# Patient Record
Sex: FEMALE | Race: BLACK OR AFRICAN AMERICAN | NOT HISPANIC OR LATINO | Employment: OTHER | URBAN - METROPOLITAN AREA
[De-identification: names, ages, dates, MRNs, and addresses within clinical notes are randomized per-mention and may not be internally consistent; named-entity substitution may affect disease eponyms.]

---

## 2017-07-13 ENCOUNTER — TELEPHONE (OUTPATIENT)
Dept: NEUROSURGERY | Facility: CLINIC | Age: 57
End: 2017-07-13

## 2017-07-13 NOTE — TELEPHONE ENCOUNTER
Op note is not in EPIC, advised that I will transferred to ,edical records. Liza stated that she spoke with medical records and did not receive note that she was looking for. Also stated that patient may have had shunt placed at [facility]. I advised her to contact [facility] at [facility]. Understanding verbalized.

## 2017-07-13 NOTE — TELEPHONE ENCOUNTER
----- Message from Chantell Pope sent at 7/13/2017  9:04 AM CDT -----  Contact: eddie ([facility]) [phone]  Eddie is calling to get a Op report for shunt placed in pt.pls call

## 2022-02-02 DIAGNOSIS — M54.12 RADICULOPATHY, CERVICAL: Primary | ICD-10-CM

## 2022-02-03 ENCOUNTER — HOSPITAL ENCOUNTER (OUTPATIENT)
Dept: RADIOLOGY | Facility: HOSPITAL | Age: 62
Discharge: HOME | End: 2022-02-03
Attending: INTERNAL MEDICINE
Payer: MEDICARE

## 2022-02-03 DIAGNOSIS — M54.12 RADICULOPATHY, CERVICAL: ICD-10-CM

## 2022-02-03 PROCEDURE — 72141 MRI NECK SPINE W/O DYE: CPT | Mod: TC

## 2022-04-07 PROBLEM — M25.561 KNEE PAIN, RIGHT: Status: ACTIVE | Noted: 2022-04-07

## 2022-04-07 PROBLEM — M54.12 RADICULOPATHY, CERVICAL: Status: ACTIVE | Noted: 2022-04-07

## 2022-04-07 PROBLEM — R73.01 IMPAIRED FASTING GLUCOSE: Status: ACTIVE | Noted: 2022-04-07

## 2022-04-07 PROBLEM — I10 BENIGN ESSENTIAL HYPERTENSION: Status: ACTIVE | Noted: 2019-08-16

## 2022-04-10 PROBLEM — Z00.00 WELLNESS EXAMINATION: Status: ACTIVE | Noted: 2022-04-10

## 2022-05-17 DIAGNOSIS — Z01.818 ENCOUNTER FOR OTHER PREPROCEDURAL EXAMINATION: ICD-10-CM

## 2022-05-17 DIAGNOSIS — Z12.11 ENCOUNTER FOR SCREENING COLONOSCOPY FOR NON-HIGH-RISK PATIENT: Primary | ICD-10-CM

## 2022-05-17 RX ORDER — LIDOCAINE HYDROCHLORIDE 10 MG/ML
1 INJECTION, SOLUTION EPIDURAL; INFILTRATION; INTRACAUDAL; PERINEURAL ONCE
Status: CANCELLED | OUTPATIENT
Start: 2022-05-17 | End: 2022-05-17

## 2022-05-17 RX ORDER — SODIUM CHLORIDE 0.9 % (FLUSH) 0.9 %
10 SYRINGE (ML) INJECTION
Status: CANCELLED | OUTPATIENT
Start: 2022-05-17

## 2022-05-17 RX ORDER — SODIUM CHLORIDE 9 MG/ML
INJECTION, SOLUTION INTRAVENOUS CONTINUOUS
Status: CANCELLED | OUTPATIENT
Start: 2022-05-17

## 2022-06-20 NOTE — DISCHARGE INSTRUCTIONS
BEFORE THE PROCEDURE:    REPORT ANY CHANGE IN YOUR PHYSICAL CONDITION TO YOUR DOCTOR IMMEDIATELY.  SELF ISOLATE AND CHECK TEMPERATURE DAILY, IF TEMP OVER 100, CALL PHYSICIAN IMMEDIATELY.  TRY TO REFRAIN FROM SMOKING AND ALCOHOL 72 HOURS BEFORE YOUR PROCEDURE.   DO NOT EAT OR DRINK ANYTHING AFTER MIDNIGHT THE NIGHT BEFORE YOUR PROCEDURE.  NO MAKE UP, NAIL POLISH OR JEWELRY.      SOMEONE WILL CALL YOU THE DAY BEFORE YOUR PROCEDURE WITH A CHECK-IN TIME FOR YOUR PROCEDURE.    DAY OF YOUR PROCEDURE:    TAKE BLOOD PRESSURE MEDICATIONS THE MORNING OF YOUR PROCEDURE, WITH SMALL SIPS WATER, AS DIRECTED BY YOUR PHYSICIAN.   DO NOT TAKE ANY DIABETIC MEDICATIONS UNLESS DIRECTED TO DO SO BY YOUR PHYSICIAN.   CONTACT LENSES AND DENTURES MUST BE REMOVED.  A RESPONSIBLE ADULT MUST ACCOMPANY YOU HOME UPON DISCHARGE.   ONLY 1 VISITOR ALLOWED PER ROOM.         MASKS ARE OPTIONAL.    YOUR THOUGHTS AND OPINIONS HELP US TO BETTER SERVE YOU.     PLEASE PARTICIPATE IN SURVEYS ABOUT YOUR CARE.    THANK YOU FOR CHOOSING [facility].

## 2022-06-21 ENCOUNTER — HOSPITAL ENCOUNTER (OUTPATIENT)
Dept: PULMONOLOGY | Facility: HOSPITAL | Age: 62
Discharge: HOME | End: 2022-06-21
Attending: SURGERY
Payer: MEDICARE

## 2022-06-21 ENCOUNTER — HOSPITAL ENCOUNTER (OUTPATIENT)
Dept: PREADMISSION TESTING | Facility: HOSPITAL | Age: 62
Discharge: HOME | End: 2022-06-21
Attending: SURGERY
Payer: MEDICARE

## 2022-06-21 VITALS — HEIGHT: 59 IN | BODY MASS INDEX: 34.27 KG/M2 | WEIGHT: 170 LBS

## 2022-06-21 DIAGNOSIS — Z12.11 ENCOUNTER FOR SCREENING COLONOSCOPY FOR NON-HIGH-RISK PATIENT: ICD-10-CM

## 2022-06-21 PROCEDURE — 93005 ELECTROCARDIOGRAM TRACING: CPT

## 2022-06-21 PROCEDURE — 93010 ELECTROCARDIOGRAM REPORT: CPT | Mod: ,,, | Performed by: INTERNAL MEDICINE

## 2022-06-21 PROCEDURE — 93010 EKG 12-LEAD: ICD-10-PCS | Mod: ,,, | Performed by: INTERNAL MEDICINE

## 2022-06-21 RX ORDER — NEBIVOLOL 10 MG/1
10 TABLET ORAL DAILY
Status: ON HOLD | COMMUNITY
Start: 2022-05-24 | End: 2022-06-27 | Stop reason: CLARIF

## 2022-06-21 RX ORDER — TIZANIDINE 4 MG/1
4 TABLET ORAL EVERY 6 HOURS PRN
COMMUNITY

## 2022-06-21 RX ORDER — AMLODIPINE BESYLATE 5 MG/1
5 TABLET ORAL DAILY
Status: ON HOLD | COMMUNITY
End: 2022-06-27 | Stop reason: CLARIF

## 2022-06-21 RX ORDER — UBROGEPANT 100 MG/1
100 TABLET ORAL ONCE AS NEEDED
COMMUNITY
End: 2023-11-20 | Stop reason: SDUPTHER

## 2022-06-22 ENCOUNTER — ANESTHESIA EVENT (OUTPATIENT)
Dept: ENDOSCOPY | Facility: HOSPITAL | Age: 62
End: 2022-06-22
Payer: MEDICARE

## 2022-06-22 NOTE — ANESTHESIA PREPROCEDURE EVALUATION
06/22/2022  Josseline Mccullough is a 62 y.o., female.      Pre-op Assessment    I have reviewed the Patient Summary Reports.    I have reviewed the NPO Status.   I have reviewed the Medications.     Review of Systems  Anesthesia Hx:  No problems with previous Anesthesia  Denies Family Hx of Anesthesia complications.   Denies Personal Hx of Anesthesia complications.   Social:  Non-Smoker    Cardiovascular:   Hypertension, well controlled ECG has been reviewed.    Pulmonary:   Shortness of breath    Renal/:  Renal/ Normal     Hepatic/GI:  Hepatic/GI Normal    Musculoskeletal:   Arthritis     Neurological:   Neuromuscular Disease, Cervical radiculopathy, SHUNT RIGHT SIDE   Psych:   Psychiatric History depression        Lab Results   Component Value Date    WBC 6.12 06/21/2022    HGB 11.4 (L) 06/21/2022    HCT 35.0 (L) 06/21/2022    MCV 89 06/21/2022     06/21/2022     CMP  Sodium   Date Value Ref Range Status   06/21/2022 141 136 - 145 mmol/L Final     Potassium   Date Value Ref Range Status   06/21/2022 3.7 3.5 - 5.1 mmol/L Final     Chloride   Date Value Ref Range Status   06/21/2022 109 95 - 110 mmol/L Final     CO2   Date Value Ref Range Status   06/21/2022 29 23 - 29 mmol/L Final     Glucose   Date Value Ref Range Status   06/21/2022 99 70 - 110 mg/dL Final     BUN   Date Value Ref Range Status   06/21/2022 10 6 - 20 mg/dL Final     Creatinine   Date Value Ref Range Status   06/21/2022 0.7 0.5 - 1.4 mg/dL Final     Calcium   Date Value Ref Range Status   06/21/2022 8.5 (L) 8.7 - 10.5 mg/dL Final     Total Protein   Date Value Ref Range Status   06/21/2022 7.1 6.0 - 8.4 g/dL Final     Albumin   Date Value Ref Range Status   06/21/2022 3.4 (L) 3.5 - 5.2 g/dL Final     Total Bilirubin   Date Value Ref Range Status   06/21/2022 0.5 0.1 - 1.0 mg/dL Final     Comment:     For infants and newborns,  interpretation of results should be based  on gestational age, weight and in agreement with clinical  observations.    Premature Infant recommended reference ranges:  Up to 24 hours.............<8.0 mg/dL  Up to 48 hours............<12.0 mg/dL  3-5 days..................<15.0 mg/dL  6-29 days.................<15.0 mg/dL    For patients on Eltrombopag therapy, use of Dimension Lindsay TBIL is   not   recommended.       Alkaline Phosphatase   Date Value Ref Range Status   06/21/2022 72 55 - 135 U/L Final     AST   Date Value Ref Range Status   06/21/2022 11 10 - 40 U/L Final     ALT   Date Value Ref Range Status   06/21/2022 16 10 - 44 U/L Final     Anion Gap   Date Value Ref Range Status   06/21/2022 3 (L) 8 - 16 mmol/L Final     eGFR if    Date Value Ref Range Status   06/21/2022 >60.0 >60 mL/min/1.73 m^2 Final     eGFR if non    Date Value Ref Range Status   06/21/2022 >60.0 >60 mL/min/1.73 m^2 Final     Comment:     Calculation used to obtain the estimated glomerular filtration  rate (eGFR) is the CKD-EPI equation.          Physical Exam  General: Well nourished    Airway:  Mallampati: III / II  Mouth Opening: Normal  TM Distance: Normal  Tongue: Normal  Neck ROM: Normal ROM    Dental:  Intact    Chest/Lungs:  Clear to auscultation    Heart:  Rate: Normal  Rhythm: Regular Rhythm  Sounds: Normal        Anesthesia Plan  Type of Anesthesia, risks & benefits discussed:    Anesthesia Type: MAC  Intra-op Monitoring Plan: Standard ASA Monitors  Post Op Pain Control Plan: multimodal analgesia  Induction:  IV  Airway Plan: Direct  Informed Consent: Informed consent signed with the Patient and all parties understand the risks and agree with anesthesia plan.  All questions answered.   ASA Score: 4  Day of Surgery Review of History & Physical: I have interviewed and examined the patient. I have reviewed the patient's H&P dated: There are no significant changes.     Ready For Surgery From  Anesthesia Perspective.     .

## 2022-06-26 PROBLEM — Z12.11 ENCOUNTER FOR SCREENING COLONOSCOPY FOR NON-HIGH-RISK PATIENT: Status: ACTIVE | Noted: 2022-06-26

## 2022-06-26 NOTE — H&P
[facility]  General Surgery  History & Physical    Patient Name: Josseline Mccullough  MRN: [id number]  Admission Date: (Not on file)  Attending Physician: Cordell Dempsey MD   Primary Care Provider: Adelaida Urbina MD    Patient information was obtained from patient and past medical records.     Subjective:     Chief Complaint/Reason for Admission:  Screening colonoscopy    History of Present Illness:  Patient is a 62 y.o. female who is now being admitted for a screening colonoscopy.  The last 1 she had was over 10 years ago.  The patient is asymptomatic and has no family history of colon cancer.    No current facility-administered medications on file prior to encounter.     Current Outpatient Medications on File Prior to Encounter   Medication Sig    ALPRAZolam (XANAX) 1 MG tablet TAKE 1 TABLET BY MOUTH 30 MINUTES BEFORE PROCEDURE. MAY REPEAT DOSE ONE TIME    aluminum-magnesium hydroxide-simethicone (MAALOX) 200-200-20 mg/5 mL Susp     ARIPiprazole (ABILIFY) 2 MG Tab 1 tablet.    aspirin (ECOTRIN) 81 MG EC tablet 1 tablet.    brivaracetam (BRIVIACT) 25 mg Tab 1/2 tab po qhs    buPROPion (WELLBUTRIN XL) 150 MG TB24 tablet     zmd-I0-tou42-zinc--quinn-bor (CALTRATE 600-D PLUS MINERALS) 600 mg calcium- 800 unit-50 mg Tab 1 tablet.    clonazePAM (KLONOPIN) 2 MG Tab 1 tablet.    dexbrompheniramine maleate (ALA-HIST IR) 2 mg Tab 1 tablet.    diclofenac sodium (VOLTAREN) 1 % Gel apply 2 grams to the affected area(s) by topical route 4 times per day    DULoxetine (CYMBALTA) 60 MG capsule 1 capsule.    ergocalciferol (ERGOCALCIFEROL) 50,000 unit Cap TAKE 1 CAPSULE BY MOUTH 2 TIMES A WEEK    fenoprofen (NAPROFEN) 600 mg Tab 1 tablet.    furosemide (LASIX) 20 MG tablet Take 20 mg by mouth once as needed.    ibuprofen (ADVIL,MOTRIN) 800 MG tablet 1 tablet.    latanoprost 0.005 % ophthalmic solution instill 1 drop into both eyes by ophthalmic route once daily in the evening    linaCLOtide (LINZESS) 145 mcg  Cap capsule Take 1 capsule (145 mcg total) by mouth before breakfast.    methocarbamoL (ROBAXIN) 750 MG Tab 1 tablet.    predniSONE (DELTASONE) 10 MG tablet Take 1 tablet (10 mg total) by mouth once daily.    prochlorperazine (COMPAZINE) 10 MG tablet 1 tablet.    rizatriptan (MAXALT) 10 MG tablet 1 tablet.    SUMAtriptan-naproxen (TREXIMET)  mg Tab 1 tablet.    traZODone (DESYREL) 50 MG tablet 1 tablet.       Review of patient's allergies indicates:   Allergen Reactions    Cortisone Hives, Itching, Swelling and Rash     RASH    Aripiprazole Hives, Rash and Other (See Comments)     MOUTH ULCERS  Other reaction(s): rash/hives    Bellad alk-peppermnt oil-anise Hives and Other (See Comments)     MOUTH ULCERS    Benadryl [diphenhydramine hcl] Hives, Itching, Swelling and Rash    Chlordiazepoxide-clidinium Hives and Rash     Other reaction(s): Mouth irritation    Ciprofloxacin Itching, Rash and Other (See Comments)     MOUTH ULCERS  Other reaction(s): rash    Codeine Hives, Itching, Swelling and Rash    Hydrochlorothiazide Hives, Itching, Swelling and Rash     Other reaction(s): rash?    Hydrocodone-acetaminophen Hives, Itching, Swelling and Rash     Other reaction(s): rash    Lexapro [escitalopram] Hives, Rash and Other (See Comments)     MOUTH ULCERS    Metaxalone Hives, Rash and Other (See Comments)     MOUTH ULCERS  Other reaction(s): Itching    Penicillins Hives, Itching, Swelling and Rash    Pregabalin Hives and Swelling     Other reaction(s): swelling, wt gain    Sulfa (sulfonamide antibiotics) Hives, Itching, Swelling and Rash     Other reaction(s): Hives    Belladonna alkaloids      Other reaction(s): mouth ulcers    Codeine sulfate     Escitalopram oxalate      Other reaction(s): rash    Moxifloxacin     Oxycodone-acetaminophen      Other reaction(s): rash    Steroid [corticosteroids (glucocorticoids)]     Oxycodone Hives, Itching, Swelling and Rash    Tapentadol Hives, Rash and  Other (See Comments)     MOUTH ULCERS  Other reaction(s): rash       Past Medical History:   Diagnosis Date    Asymptomatic varicose veins of unspecified lower extremity     CSF leak     depression     Depression     Diabetes mellitus     Edema     Gastritis     Headache     History of chest pain     IFG (impaired fasting glucose)     Major depressive disorder, recurrent episode, mild     Obesity     Osteoarthritis of right hand     Palpitations     Postmenopausal disorder     Radiculopathy, cervical     Sleep disorder     Snoring     SOB (shortness of breath)     Tendonitis     Unspecified glaucoma      Past Surgical History:   Procedure Laterality Date    CARPAL TUNNEL RELEASE Right 2019    Dr Subramanian     SECTION  1992    COLONOSCOPY  2012    Dr Dempsey melanosis coli, mild proctits    FINGER SURGERY Left 2019    nodule removed from fingers on lt hand    HYSTERECTOMY  2009    Dr Kong    NECK SURGERY      SHUNT REVISION  2012    [facility]    TUBAL LIGATION       Family History     Problem Relation (Age of Onset)    Arthritis Mother    Asthma Mother    Diabetes Mother, Sister    Heart attack Mother    Heart failure Father (55)    Hyperlipidemia Mother    Hypertension Mother, Father, Sister    Lung cancer Mother (73)    No Known Problems Brother, Sister    Stroke Father        Tobacco Use    Smoking status: Never Smoker    Smokeless tobacco: Never Used   Substance and Sexual Activity    Alcohol use: Yes     Alcohol/week: 0.8 standard drinks     Types: 1 Standard drinks or equivalent per week     Comment: social    Drug use: No    Sexual activity: Not on file     Review of Systems   Constitutional: Negative for activity change and appetite change.   Gastrointestinal: Negative for abdominal distention, abdominal pain and anal bleeding.   Neurological:        History of Chiari malformation with low-pressure  hydrocephalus treated with a  shunt     Objective:     Vital Signs (Most Recent):    Vital Signs (24h Range):           There is no height or weight on file to calculate BMI.    Physical Exam  Constitutional:       Appearance: Normal appearance.   HENT:      Head:      Comments: Patient has a palpable reservoir on the right side of her head with a palpable shunt down the right side towards the neck and chest.     Nose: Nose normal.      Mouth/Throat:      Mouth: Mucous membranes are moist.   Eyes:      Extraocular Movements: Extraocular movements intact.   Neck:      Comments: Presence of a shunt subcutaneously going to the right side of her chest.  Cardiovascular:      Rate and Rhythm: Normal rate and regular rhythm.   Pulmonary:      Effort: Pulmonary effort is normal.      Breath sounds: Normal breath sounds.   Abdominal:      General: Abdomen is flat. There is no distension.      Palpations: Abdomen is soft. There is no mass.      Tenderness: There is no abdominal tenderness.      Hernia: No hernia is present.      Comments: There is an incision in the right upper quadrant where the shunt is entering the abdominal cavity.   Musculoskeletal:         General: Normal range of motion.      Cervical back: Neck supple.   Lymphadenopathy:      Cervical: No cervical adenopathy.   Skin:     General: Skin is warm and dry.      Coloration: Skin is not jaundiced.   Neurological:      General: No focal deficit present.      Mental Status: She is alert and oriented to person, place, and time.   Psychiatric:         Mood and Affect: Mood normal.         Behavior: Behavior normal.         Significant Labs:  I have reviewed all pertinent lab results within the past 24 hours.    Significant Diagnostics:  I have reviewed all pertinent imaging results/findings within the past 24 hours.    Assessment/Plan:     Active Diagnoses:    Diagnosis Date Noted POA    PRINCIPAL PROBLEM:  Encounter for screening colonoscopy for  non-high-risk patient [Z12.11] 06/26/2022 Not Applicable      Problems Resolved During this Admission:     VTE Risk Mitigation (From admission, onward)    None          Cordell Dempsey MD  General Surgery  [facility]

## 2022-06-27 ENCOUNTER — HOSPITAL ENCOUNTER (OUTPATIENT)
Facility: HOSPITAL | Age: 62
Discharge: HOME | End: 2022-06-27
Attending: SURGERY | Admitting: SURGERY
Payer: MEDICARE

## 2022-06-27 ENCOUNTER — ANESTHESIA (OUTPATIENT)
Dept: ENDOSCOPY | Facility: HOSPITAL | Age: 62
End: 2022-06-27
Payer: MEDICARE

## 2022-06-27 VITALS
HEART RATE: 68 BPM | TEMPERATURE: 98 F | SYSTOLIC BLOOD PRESSURE: 145 MMHG | OXYGEN SATURATION: 96 % | RESPIRATION RATE: 20 BRPM | DIASTOLIC BLOOD PRESSURE: 54 MMHG

## 2022-06-27 DIAGNOSIS — Z12.11 ENCOUNTER FOR SCREENING COLONOSCOPY FOR NON-HIGH-RISK PATIENT: Primary | ICD-10-CM

## 2022-06-27 DIAGNOSIS — D12.4 ADENOMATOUS POLYP OF DESCENDING COLON: Chronic | ICD-10-CM

## 2022-06-27 DIAGNOSIS — D12.3 ADENOMATOUS POLYP OF TRANSVERSE COLON: Chronic | ICD-10-CM

## 2022-06-27 PROCEDURE — 37000008 HC ANESTHESIA 1ST 15 MINUTES: Performed by: SURGERY

## 2022-06-27 PROCEDURE — 25000003 PHARM REV CODE 250: Performed by: SURGERY

## 2022-06-27 PROCEDURE — 37000009 HC ANESTHESIA EA ADD 15 MINS: Performed by: SURGERY

## 2022-06-27 PROCEDURE — 45384 COLONOSCOPY W/LESION REMOVAL: CPT | Mod: PT | Performed by: SURGERY

## 2022-06-27 RX ORDER — OLMESARTAN MEDOXOMIL 40 MG/1
40 TABLET ORAL DAILY
COMMUNITY
End: 2023-05-17 | Stop reason: SDUPTHER

## 2022-06-27 RX ORDER — PROPOFOL 10 MG/ML
VIAL (ML) INTRAVENOUS
Status: DISCONTINUED | OUTPATIENT
Start: 2022-06-27 | End: 2022-06-27

## 2022-06-27 RX ORDER — LIDOCAINE HYDROCHLORIDE 10 MG/ML
INJECTION, SOLUTION INTRAVENOUS
Status: DISCONTINUED | OUTPATIENT
Start: 2022-06-27 | End: 2022-06-27

## 2022-06-27 RX ORDER — SODIUM CHLORIDE 9 MG/ML
INJECTION, SOLUTION INTRAVENOUS CONTINUOUS
Status: DISCONTINUED | OUTPATIENT
Start: 2022-06-27 | End: 2022-06-27 | Stop reason: HOSPADM

## 2022-06-27 RX ORDER — SODIUM CHLORIDE 0.9 % (FLUSH) 0.9 %
10 SYRINGE (ML) INJECTION
Status: DISCONTINUED | OUTPATIENT
Start: 2022-06-27 | End: 2022-06-27 | Stop reason: HOSPADM

## 2022-06-27 RX ORDER — LIDOCAINE HYDROCHLORIDE 10 MG/ML
1 INJECTION, SOLUTION EPIDURAL; INFILTRATION; INTRACAUDAL; PERINEURAL ONCE
Status: DISCONTINUED | OUTPATIENT
Start: 2022-06-27 | End: 2022-06-27 | Stop reason: HOSPADM

## 2022-06-27 RX ADMIN — Medication 40 MG: at 08:06

## 2022-06-27 RX ADMIN — Medication 30 MG: at 08:06

## 2022-06-27 RX ADMIN — Medication 20 MG: at 08:06

## 2022-06-27 RX ADMIN — SODIUM CHLORIDE: 0.9 INJECTION, SOLUTION INTRAVENOUS at 07:06

## 2022-06-27 RX ADMIN — Medication 100 MG: at 08:06

## 2022-06-27 RX ADMIN — LIDOCAINE HYDROCHLORIDE 50 MG: 10 INJECTION, SOLUTION INTRAVENOUS at 08:06

## 2022-06-27 NOTE — DISCHARGE INSTRUCTIONS
NO DRIVING OR ALCOHOL 24 HOURS AFTER PROCEDURE     NOTIFY DR BRIGGS OF ANY PROBLEMS OR CONCERNS    FOLLOW UP WITH DR BRIGGS    July 5 AT 1030  AM

## 2022-06-27 NOTE — TRANSFER OF CARE
Anesthesia Transfer of Care Note    Patient: Josseline Mccullough    Procedure(s) Performed: Procedure(s) (LRB):  COLONOSCOPY, WITH POLYPECTOMY USING HOT BIOPSY FORCEPS (N/A)    Patient location: GI    Anesthesia Type: MAC    Transport from OR: Transported from OR on room air with adequate spontaneous ventilation    Post pain: adequate analgesia    Post assessment: no apparent anesthetic complications    Post vital signs: stable    Level of consciousness: awake    Nausea/Vomiting: no nausea/vomiting    Complications: none    Transfer of care protocol was followed      Last vitals:   HR 68  RR 16  T 36.7  SPO2 100  /56

## 2022-06-27 NOTE — DISCHARGE SUMMARY
[facility] - Endoscopy  Discharge Note  Short Stay    Procedure(s) (LRB):  COLONOSCOPY, WITH POLYPECTOMY USING HOT BIOPSY FORCEPS (N/A)    OUTCOME: Patient tolerated treatment/procedure well without complication and is now ready for discharge.    DISPOSITION: Home or Self Care    FINAL DIAGNOSIS:  1. Encounter for screening colonoscopy for non-high-risk patient 2. Colon polyps at 40 cm in the proximal descending colon and at 55 cm in the distal transverse colon    FOLLOWUP: Patient is to follow up in clinic in 1 week for pathology report.    DISCHARGE INSTRUCTIONS:    Discharge Procedure Orders   Diet Adult Regular     Notify your health care provider if you experience any of the following:  temperature >100.4     Notify your health care provider if you experience any of the following:  persistent nausea and vomiting or diarrhea     Notify your health care provider if you experience any of the following:  severe uncontrolled pain     Activity as tolerated        TIME SPENT ON DISCHARGE:  15 minutes

## 2022-06-27 NOTE — OP NOTE
[facility] - Endoscopy  Colonoscopy Procedure  Operative Note    SUMMARY     Date of Procedure: 6/27/2022     Procedure: Procedure(s) (LRB):  COLONOSCOPY, WITH POLYPECTOMY USING HOT BIOPSY FORCEPS (N/A)    Surgeon(s) and Role:     * Cordell Dempsey MD - Primary    Assisting Surgeon: None     Patient location: PACU    Pre-Operative Diagnosis: Encounter for screening colonoscopy for non-high-risk patient [Z12.11]    Post-Operative Diagnosis: Post-Op Diagnosis Codes:     * Encounter for screening colonoscopy for non-high-risk patient [Z12.11]     * Adenomatous polyp of transverse colon [D12.3]     * Adenomatous polyp of descending colon [D12.4]     Indications:  Screening colonoscopy     Anesthesia:  Mac        Procedure:                  The patient was brought in to the endoscopy suite where the risks, benefits, and alternatives of the procedure were described.  The patient was given the opportunity to ask questions and then signed informed consent.  Patient was positioned in the left lateral decubitus position, continuous monitoring was initiated, and supplemental oxygen was provided via nasal cannula.  Adequate sedation was achieved with the above mentioned medications and then titrated during the entire procedure.  Digital rectal exam was performed.  Under direct visualization the colonoscope was introduced through the anus in to the rectum.  The scope was then advanced to the cecum, which was identified by the ileocecal valve and appendiceal orifice.  Scope was then withdrawn and the mucosa was carefully examined in a circular fashion.  The entire colonic mucosa was examined, including the rectum with retroflexion.  Air was evacuated from the colon and the procedure was terminated.  The patient tolerated the procedure well and was able to be transferred to the recovery area in stable condition.    Findings:                 Digital rectal examination:  Rectal exam showed normal sphincter tone with no masses palpated.   There was no blood on the glove.                      Rectum:  Rectal mucosa appeared unremarkable                    Sigmoid:  Sigmoid colon was normal        Descending:  In the descending colon at about 40 cm a small polyp identified and removed via the hot biopsy forceps.         Transverse:  In the transverse colon at about 55 cm another polyp identified and removed via hot biopsy forceps         Ascending:  Ascending colon was normal        Cecum:  Cecal mucosa appeared unremarkable.  Ileocecal valve and appendiceal orifice appeared normal.  We tried to cannulate the ileocecal valve and advanced into the terminal ileum but were unable to do that.  After much trying we ceased and desisted.        Terminal Ileum:  Not visualized     Specimens:   Specimens (From admission, onward)     Start     Ordered    06/27/22 0836  Specimen to Pathology, Surgery Gastrointestinal tract  Once        Comments: Pre-op Diagnosis: Encounter for screening colonoscopy for non-high-risk patient [Z12.11]Procedure(s):COLONOSCOPY Number of specimens: 1Name of specimens: 1 polyp at 55cm @10992 polyp at 40 cm @0839     References:    Click here for ordering Quick Tip   Question Answer Comment   Procedure Type: Gastrointestinal tract    Specimen Class: Routine/Screening    Which provider would you like to cc? JENNIFER BRIGGS    Release to patient Immediate        06/27/22 0842                  Estimated Blood Loss (EBL): * No values recorded between 6/27/2022 12:00 AM and 6/27/2022  8:42 AM *     Complications: No     Diagnostic Impression:  1. Screening colonoscopy 2. Polyp in the descending colon at 40 cm and in the distal transverse colon at 55 cm.      Recommendations: Discharge patient to home.    Disposition: PACU - hemodynamically stable.     Attestation: I performed the procedure.        Follow Up:             Future Appointments   Date Time Provider Department Center   8/8/2022 10:05 AM Adelaida Urbina MD [facility]            Cordell Dempsey MD  6/27/2022

## 2022-06-27 NOTE — INTERVAL H&P NOTE
The patient has been examined and the H&P has been reviewed:    I concur with the findings and no changes have occurred since H&P was written.    Surgery risks, benefits and alternative options discussed and understood by patient/family.          Active Hospital Problems    Diagnosis  POA    *Encounter for screening colonoscopy for non-high-risk patient [Z12.11]  Not Applicable      Resolved Hospital Problems   No resolved problems to display.

## 2022-06-28 LAB — POCT GLUCOSE: 99 MG/DL (ref 70–110)

## 2022-06-28 NOTE — ANESTHESIA POSTPROCEDURE EVALUATION
Anesthesia Post Evaluation    Patient: Josseline Mccullough    Procedure(s) Performed: Procedure(s) (LRB):  COLONOSCOPY, WITH POLYPECTOMY USING HOT BIOPSY FORCEPS (N/A)    Final Anesthesia Type: MAC      Patient location during evaluation: OPS  Patient participation: Yes- Able to Participate  Level of consciousness: awake  Post-procedure vital signs: reviewed and stable  Pain management: adequate  Airway patency: patent    PONV status at discharge: No PONV  Anesthetic complications: no      Cardiovascular status: blood pressure returned to baseline  Respiratory status: spontaneous ventilation  Hydration status: euvolemic  Follow-up not needed.          Vitals Value Taken Time   /54 06/27/22 0903   Temp 36.7 °C (98 °F) 06/27/22 0903   Pulse 68 06/27/22 0903   Resp 20 06/27/22 0903   SpO2 96 % 06/27/22 0903         No case tracking events are documented in the log.      Pain/Ryan Score: Ryan Score: 10 (6/27/2022  9:03 AM)        
19

## 2022-06-29 LAB — SPECIMEN TO PATHOLOGY - SURGICAL: NORMAL

## 2022-07-11 PROBLEM — Z00.00 WELLNESS EXAMINATION: Status: RESOLVED | Noted: 2022-04-10 | Resolved: 2022-07-11

## 2022-09-15 ENCOUNTER — LAB VISIT (OUTPATIENT)
Dept: LAB | Facility: HOSPITAL | Age: 62
End: 2022-09-15
Attending: INTERNAL MEDICINE
Payer: MEDICARE

## 2022-09-15 DIAGNOSIS — I10 BENIGN ESSENTIAL HYPERTENSION: ICD-10-CM

## 2022-09-15 PROBLEM — R22.1 NECK SWELLING: Status: ACTIVE | Noted: 2022-09-15

## 2022-09-15 LAB
ALBUMIN SERPL BCP-MCNC: 3.5 G/DL (ref 3.5–5.2)
ALP SERPL-CCNC: 73 U/L (ref 55–135)
ALT SERPL W/O P-5'-P-CCNC: 15 U/L (ref 10–44)
ANION GAP SERPL CALC-SCNC: 2 MMOL/L (ref 8–16)
AST SERPL-CCNC: 12 U/L (ref 10–40)
BILIRUB SERPL-MCNC: 0.4 MG/DL (ref 0.1–1)
BUN SERPL-MCNC: 10 MG/DL (ref 6–20)
CALCIUM SERPL-MCNC: 8.8 MG/DL (ref 8.7–10.5)
CHLORIDE SERPL-SCNC: 109 MMOL/L (ref 95–110)
CO2 SERPL-SCNC: 28 MMOL/L (ref 23–29)
CREAT SERPL-MCNC: 0.7 MG/DL (ref 0.5–1.4)
EST. GFR  (NO RACE VARIABLE): >60 ML/MIN/1.73 M^2
GLUCOSE SERPL-MCNC: 113 MG/DL (ref 70–110)
POTASSIUM SERPL-SCNC: 3.7 MMOL/L (ref 3.5–5.1)
PROT SERPL-MCNC: 7.3 G/DL (ref 6–8.4)
SODIUM SERPL-SCNC: 139 MMOL/L (ref 136–145)

## 2022-09-15 PROCEDURE — 80053 COMPREHEN METABOLIC PANEL: CPT | Performed by: INTERNAL MEDICINE

## 2022-09-15 PROCEDURE — 36415 COLL VENOUS BLD VENIPUNCTURE: CPT | Performed by: INTERNAL MEDICINE

## 2022-09-16 ENCOUNTER — LAB VISIT (OUTPATIENT)
Dept: LAB | Facility: HOSPITAL | Age: 62
End: 2022-09-16
Attending: INTERNAL MEDICINE
Payer: MEDICARE

## 2022-09-16 DIAGNOSIS — E03.9 HYPOTHYROIDISM, UNSPECIFIED TYPE: ICD-10-CM

## 2022-09-16 LAB — TSH SERPL DL<=0.005 MIU/L-ACNC: 1.06 UIU/ML (ref 0.4–4)

## 2022-09-16 PROCEDURE — 36415 COLL VENOUS BLD VENIPUNCTURE: CPT | Performed by: INTERNAL MEDICINE

## 2022-09-16 PROCEDURE — 84443 ASSAY THYROID STIM HORMONE: CPT | Performed by: INTERNAL MEDICINE

## 2022-09-20 ENCOUNTER — HOSPITAL ENCOUNTER (OUTPATIENT)
Dept: RADIOLOGY | Facility: HOSPITAL | Age: 62
Discharge: HOME | End: 2022-09-20
Attending: INTERNAL MEDICINE
Payer: MEDICARE

## 2022-09-20 DIAGNOSIS — G89.29 CHRONIC NONINTRACTABLE HEADACHE, UNSPECIFIED HEADACHE TYPE: ICD-10-CM

## 2022-09-20 DIAGNOSIS — R51.9 CHRONIC NONINTRACTABLE HEADACHE, UNSPECIFIED HEADACHE TYPE: ICD-10-CM

## 2022-09-20 PROCEDURE — 70551 MRI BRAIN STEM W/O DYE: CPT | Mod: TC

## 2023-07-17 PROBLEM — E66.01 SEVERE OBESITY (BMI 35.0-39.9) WITH COMORBIDITY: Status: ACTIVE | Noted: 2023-07-17

## 2023-07-17 PROBLEM — K59.00 CONSTIPATION: Status: ACTIVE | Noted: 2023-07-17

## 2023-07-17 PROBLEM — F33.0 MILD EPISODE OF RECURRENT MAJOR DEPRESSIVE DISORDER: Status: ACTIVE | Noted: 2023-07-17

## 2023-07-17 PROBLEM — M25.511 ACUTE PAIN OF RIGHT SHOULDER: Status: ACTIVE | Noted: 2023-07-17

## 2023-10-04 ENCOUNTER — PATIENT MESSAGE (OUTPATIENT)
Dept: ADMINISTRATIVE | Facility: OTHER | Age: 63
End: 2023-10-04
Payer: MEDICARE

## 2023-10-16 PROBLEM — Z00.00 WELLNESS EXAMINATION: Status: RESOLVED | Noted: 2022-04-10 | Resolved: 2023-10-16

## 2023-11-20 ENCOUNTER — LAB VISIT (OUTPATIENT)
Dept: LAB | Facility: HOSPITAL | Age: 63
End: 2023-11-20
Attending: INTERNAL MEDICINE
Payer: MEDICARE

## 2023-11-20 DIAGNOSIS — R73.01 IMPAIRED FASTING GLUCOSE: ICD-10-CM

## 2023-11-20 DIAGNOSIS — I10 BENIGN ESSENTIAL HYPERTENSION: ICD-10-CM

## 2023-11-20 DIAGNOSIS — Z00.00 WELLNESS EXAMINATION: ICD-10-CM

## 2023-11-20 LAB
ALBUMIN SERPL BCP-MCNC: 3.4 G/DL (ref 3.5–5.2)
ALBUMIN/CREAT UR: NORMAL UG/MG (ref 0–30)
ALP SERPL-CCNC: 61 U/L (ref 55–135)
ALT SERPL W/O P-5'-P-CCNC: 16 U/L (ref 10–44)
ANION GAP SERPL CALC-SCNC: 8 MMOL/L (ref 3–11)
AST SERPL-CCNC: 14 U/L (ref 10–40)
BASOPHILS # BLD AUTO: 0.03 K/UL (ref 0–0.2)
BASOPHILS NFR BLD: 0.5 % (ref 0–1.9)
BILIRUB SERPL-MCNC: 0.5 MG/DL (ref 0.1–1)
BUN SERPL-MCNC: 13 MG/DL (ref 8–23)
CALCIUM SERPL-MCNC: 8.7 MG/DL (ref 8.7–10.5)
CHLORIDE SERPL-SCNC: 105 MMOL/L (ref 95–110)
CO2 SERPL-SCNC: 28 MMOL/L (ref 23–29)
CREAT SERPL-MCNC: 0.8 MG/DL (ref 0.5–1.4)
CREAT UR-MCNC: 73 MG/DL (ref 15–325)
DIFFERENTIAL METHOD: ABNORMAL
EOSINOPHIL # BLD AUTO: 0.1 K/UL (ref 0–0.5)
EOSINOPHIL NFR BLD: 1.7 % (ref 0–8)
ERYTHROCYTE [DISTWIDTH] IN BLOOD BY AUTOMATED COUNT: 13 % (ref 11.5–14.5)
EST. GFR  (NO RACE VARIABLE): >60 ML/MIN/1.73 M^2
GLUCOSE SERPL-MCNC: 97 MG/DL (ref 70–110)
HCT VFR BLD AUTO: 34.9 % (ref 37–48.5)
HGB BLD-MCNC: 11.1 G/DL (ref 12–16)
IMM GRANULOCYTES # BLD AUTO: 0.02 K/UL (ref 0–0.04)
IMM GRANULOCYTES NFR BLD AUTO: 0.3 % (ref 0–0.5)
LYMPHOCYTES # BLD AUTO: 2.7 K/UL (ref 1–4.8)
LYMPHOCYTES NFR BLD: 45 % (ref 18–48)
MCH RBC QN AUTO: 29.7 PG (ref 27–31)
MCHC RBC AUTO-ENTMCNC: 31.8 G/DL (ref 32–36)
MCV RBC AUTO: 93 FL (ref 82–98)
MICROALBUMIN UR DL<=1MG/L-MCNC: <5 MG/L
MONOCYTES # BLD AUTO: 0.5 K/UL (ref 0.3–1)
MONOCYTES NFR BLD: 7.6 % (ref 4–15)
NEUTROPHILS # BLD AUTO: 2.7 K/UL (ref 1.8–7.7)
NEUTROPHILS NFR BLD: 44.9 % (ref 38–73)
NRBC BLD-RTO: 0 /100 WBC
PLATELET # BLD AUTO: 367 K/UL (ref 150–450)
PMV BLD AUTO: 10.4 FL (ref 9.2–12.9)
POTASSIUM SERPL-SCNC: 3.4 MMOL/L (ref 3.5–5.1)
PROT SERPL-MCNC: 7.6 G/DL (ref 6–8.4)
RBC # BLD AUTO: 3.74 M/UL (ref 4–5.4)
SODIUM SERPL-SCNC: 141 MMOL/L (ref 136–145)
TSH SERPL DL<=0.005 MIU/L-ACNC: 1.14 UIU/ML (ref 0.4–4)
WBC # BLD AUTO: 5.91 K/UL (ref 3.9–12.7)

## 2023-11-20 PROCEDURE — 82043 UR ALBUMIN QUANTITATIVE: CPT | Performed by: INTERNAL MEDICINE

## 2023-11-20 PROCEDURE — 85025 COMPLETE CBC W/AUTO DIFF WBC: CPT | Performed by: INTERNAL MEDICINE

## 2023-11-20 PROCEDURE — 80053 COMPREHEN METABOLIC PANEL: CPT | Performed by: INTERNAL MEDICINE

## 2023-11-20 PROCEDURE — 84443 ASSAY THYROID STIM HORMONE: CPT | Performed by: INTERNAL MEDICINE

## 2023-11-20 PROCEDURE — 36415 COLL VENOUS BLD VENIPUNCTURE: CPT | Performed by: INTERNAL MEDICINE

## 2023-12-11 DIAGNOSIS — Z12.31 ENCOUNTER FOR SCREENING MAMMOGRAM FOR BREAST CANCER: Primary | ICD-10-CM

## 2023-12-13 ENCOUNTER — HOSPITAL ENCOUNTER (OUTPATIENT)
Dept: RADIOLOGY | Facility: HOSPITAL | Age: 63
Discharge: HOME | End: 2023-12-13
Attending: OBSTETRICS & GYNECOLOGY
Payer: MEDICARE

## 2023-12-13 VITALS — WEIGHT: 185 LBS | HEIGHT: 59 IN | BODY MASS INDEX: 37.29 KG/M2

## 2023-12-13 DIAGNOSIS — Z12.31 ENCOUNTER FOR SCREENING MAMMOGRAM FOR BREAST CANCER: ICD-10-CM

## 2023-12-13 PROCEDURE — 77067 SCR MAMMO BI INCL CAD: CPT | Mod: TC

## 2023-12-15 DIAGNOSIS — Z12.31 ENCOUNTER FOR SCREENING MAMMOGRAM FOR MALIGNANT NEOPLASM OF BREAST: Primary | ICD-10-CM

## 2024-04-15 PROBLEM — J30.9 ALLERGIC RHINITIS: Status: ACTIVE | Noted: 2024-04-15

## 2024-05-29 DIAGNOSIS — D17.39 BENIGN LIPOMATOUS NEOPLASM OF SKIN AND SUBCUTANEOUS TISSUE OF OTHER SITES: ICD-10-CM

## 2024-05-29 DIAGNOSIS — R22.2 LOCALIZED SWELLING, MASS AND LUMP, TRUNK: Primary | ICD-10-CM

## 2024-07-01 PROBLEM — Z00.00 WELLNESS EXAMINATION: Status: RESOLVED | Noted: 2022-04-10 | Resolved: 2024-07-01

## 2024-07-16 ENCOUNTER — OFFICE VISIT (OUTPATIENT)
Dept: ENDOCRINOLOGY | Facility: CLINIC | Age: 64
End: 2024-07-16
Payer: MEDICARE

## 2024-07-16 VITALS
BODY MASS INDEX: 38.17 KG/M2 | WEIGHT: 189 LBS | OXYGEN SATURATION: 98 % | HEART RATE: 73 BPM | SYSTOLIC BLOOD PRESSURE: 137 MMHG | DIASTOLIC BLOOD PRESSURE: 64 MMHG

## 2024-07-16 DIAGNOSIS — I10 BENIGN ESSENTIAL HYPERTENSION: ICD-10-CM

## 2024-07-16 DIAGNOSIS — E11.65 TYPE 2 DIABETES MELLITUS WITH HYPERGLYCEMIA, WITHOUT LONG-TERM CURRENT USE OF INSULIN: Primary | ICD-10-CM

## 2024-07-16 DIAGNOSIS — E66.01 SEVERE OBESITY (BMI 35.0-39.9) WITH COMORBIDITY: ICD-10-CM

## 2024-07-16 PROCEDURE — 3075F SYST BP GE 130 - 139MM HG: CPT | Mod: CPTII,S$GLB,, | Performed by: STUDENT IN AN ORGANIZED HEALTH CARE EDUCATION/TRAINING PROGRAM

## 2024-07-16 PROCEDURE — 1160F RVW MEDS BY RX/DR IN RCRD: CPT | Mod: CPTII,S$GLB,, | Performed by: STUDENT IN AN ORGANIZED HEALTH CARE EDUCATION/TRAINING PROGRAM

## 2024-07-16 PROCEDURE — 3044F HG A1C LEVEL LT 7.0%: CPT | Mod: CPTII,S$GLB,, | Performed by: STUDENT IN AN ORGANIZED HEALTH CARE EDUCATION/TRAINING PROGRAM

## 2024-07-16 PROCEDURE — 99999 PR PBB SHADOW E&M-EST. PATIENT-LVL IV: CPT | Mod: PBBFAC,,, | Performed by: STUDENT IN AN ORGANIZED HEALTH CARE EDUCATION/TRAINING PROGRAM

## 2024-07-16 PROCEDURE — 3078F DIAST BP <80 MM HG: CPT | Mod: CPTII,S$GLB,, | Performed by: STUDENT IN AN ORGANIZED HEALTH CARE EDUCATION/TRAINING PROGRAM

## 2024-07-16 PROCEDURE — 4010F ACE/ARB THERAPY RXD/TAKEN: CPT | Mod: CPTII,S$GLB,, | Performed by: STUDENT IN AN ORGANIZED HEALTH CARE EDUCATION/TRAINING PROGRAM

## 2024-07-16 PROCEDURE — 3008F BODY MASS INDEX DOCD: CPT | Mod: CPTII,S$GLB,, | Performed by: STUDENT IN AN ORGANIZED HEALTH CARE EDUCATION/TRAINING PROGRAM

## 2024-07-16 PROCEDURE — 99204 OFFICE O/P NEW MOD 45 MIN: CPT | Mod: S$GLB,,, | Performed by: STUDENT IN AN ORGANIZED HEALTH CARE EDUCATION/TRAINING PROGRAM

## 2024-07-16 PROCEDURE — G2211 COMPLEX E/M VISIT ADD ON: HCPCS | Mod: S$GLB,,, | Performed by: STUDENT IN AN ORGANIZED HEALTH CARE EDUCATION/TRAINING PROGRAM

## 2024-07-16 PROCEDURE — 1159F MED LIST DOCD IN RCRD: CPT | Mod: CPTII,S$GLB,, | Performed by: STUDENT IN AN ORGANIZED HEALTH CARE EDUCATION/TRAINING PROGRAM

## 2024-07-16 RX ORDER — SEMAGLUTIDE 0.68 MG/ML
INJECTION, SOLUTION SUBCUTANEOUS
Qty: 3 ML | Refills: 0 | Status: SHIPPED | OUTPATIENT
Start: 2024-07-16

## 2024-07-16 NOTE — ASSESSMENT & PLAN NOTE
Confirmed with fasting glucose > 126  Benefits from GLP-1 RA therapy given BMI 38    Plan  - Start Ozempic 0.25 mg weekly, increase to 0.5 mg weekly after 4 weeks if tolerated    Labs per PCP

## 2024-07-16 NOTE — PROGRESS NOTES
"Subjective:      Patient ID: Josseline Mccullough is a 64 y.o. female.    Chief Complaint:  Type 2 diabetes mellitus    History of Present Illness  This is a 64 y.o. female. with a past medical history of Type 2 diabetes mellitus, hydrocephalus s/p  shunt here for evaluation.      Type 2 diabetes mellitus  Diagnosed in 2011 07/30/11 04:40 07/31/11 04:22   Fasting  Glucose 140 (H) 187 (H)     Component      Latest Ref Rng 11/20/2023 3/26/2024   Hemoglobin A1C, POC      % 6.3  6.1        Current diabetes medications:  - None    Past diabetes medications:  - None    Lab Results   Component Value Date    CREATININE 0.8 11/20/2023    EGFRNORACEVR >60.0 11/20/2023       Known diabetic complications: none    Weight trend:  Wt Readings from Last 8 Encounters:   07/16/24 85.7 kg (189 lb)   03/26/24 83.5 kg (184 lb)   12/13/23 83.9 kg (185 lb)   11/20/23 83.9 kg (185 lb)   07/17/23 86 kg (189 lb 8 oz)   03/14/23 83.5 kg (184 lb)   11/10/22 89.4 kg (197 lb)   09/15/22 89.8 kg (198 lb)       Family history of diabetes:  Mother, sister    Prior visit with diabetes education: Yes    Current diet: 3 meals per day  Current exercise: Limited    Blood glucose monitoring at home: No    Diabetes Management Status  Statin: Taking  ACE/ARB: Taking    Screening or Prevention Patient's value   HgA1C Testing and Control   No results found for: "HGBA1C"     LDL control No results found for: "LDLCALC"   Nephropathy screening Lab Results   Component Value Date    MICALBCREAT Unable to calculate 11/20/2023        Lab Results   Component Value Date    TSH 1.140 11/20/2023       Last eye exam: Most Recent Eye Exam Date: Not Found      Review of Systems  As above    Social and family history reviewed  Current medications and allergies reviewed    Objective:   /64 (BP Location: Left arm, Patient Position: Sitting)   Pulse 73   Wt 85.7 kg (189 lb)   SpO2 98%   BMI 38.17 kg/m²   Physical Exam  Alert, oriented    BP Readings from Last 1 " "Encounters:   07/16/24 137/64      Wt Readings from Last 1 Encounters:   07/16/24 0927 85.7 kg (189 lb)     Body mass index is 38.17 kg/m².    Lab Review:   No results found for: "HGBA1C"  No results found for: "CHOL", "HDL", "LDLCALC", "TRIG", "CHOLHDL"  Lab Results   Component Value Date     11/20/2023    K 3.4 (L) 11/20/2023     11/20/2023    CO2 28 11/20/2023    GLU 97 11/20/2023    BUN 13 11/20/2023    CREATININE 0.8 11/20/2023    CALCIUM 8.7 11/20/2023    PROT 7.6 11/20/2023    ALBUMIN 3.4 (L) 11/20/2023    BILITOT 0.5 11/20/2023    ALKPHOS 61 11/20/2023    AST 14 11/20/2023    ALT 16 11/20/2023    ANIONGAP 8 11/20/2023    ESTGFRAFRICA >60.0 06/21/2022    EGFRNONAA >60.0 06/21/2022    TSH 1.140 11/20/2023       All pertinent labs reviewed    Assessment and Plan     Type 2 diabetes mellitus with hyperglycemia, without long-term current use of insulin  Confirmed with fasting glucose > 126  Benefits from GLP-1 RA therapy given BMI 38    Plan  - Start Ozempic 0.25 mg weekly, increase to 0.5 mg weekly after 4 weeks if tolerated    Labs per PCP    Severe obesity (BMI 35.0-39.9) with comorbidity  Start Ozempic 0.25 mg weekly, increase to 0.5 mg weekly after 4 weeks if tolerated    Benign essential hypertension  Continue antihypertensive regimen including ARB/ACEi      Follow-up in 6 months    Mandeep Reinoso MD  Endocrinology    "

## 2024-07-30 PROBLEM — M79.645 THUMB PAIN, LEFT: Status: ACTIVE | Noted: 2024-07-30

## 2024-08-27 ENCOUNTER — TELEPHONE (OUTPATIENT)
Dept: ENDOCRINOLOGY | Facility: CLINIC | Age: 64
End: 2024-08-27
Payer: MEDICARE

## 2024-08-27 DIAGNOSIS — E11.65 TYPE 2 DIABETES MELLITUS WITH HYPERGLYCEMIA, WITHOUT LONG-TERM CURRENT USE OF INSULIN: ICD-10-CM

## 2024-08-27 RX ORDER — SEMAGLUTIDE 0.68 MG/ML
0.5 INJECTION, SOLUTION SUBCUTANEOUS
Qty: 3 ML | Refills: 11 | Status: SHIPPED | OUTPATIENT
Start: 2024-08-27

## 2024-08-27 NOTE — TELEPHONE ENCOUNTER
----- Message from Jammie Mai sent at 2024  9:27 AM CDT -----  Contact: Patient  Josseline Mccullough  MRN: [id number]  : 1960  PCP: Adelaida Urbina  Home Phone      [phone]  Work Phone      Not on file.  Mobile          [phone]  Mobile          Not on file.      MESSAGE: Patient would like a returned call to discuss her prescription for Ozempic. She says she's been out for almost a week and would like an update on whether or not she'll need samples. Please return this call.     PHONE; [phone]

## 2024-08-27 NOTE — TELEPHONE ENCOUNTER
Pt states that she is completely out of ozempic she will be coming in office in [place] to  some samples.

## 2024-10-16 ENCOUNTER — HOSPITAL ENCOUNTER (OUTPATIENT)
Dept: RADIOLOGY | Facility: HOSPITAL | Age: 64
Discharge: HOME | End: 2024-10-16
Attending: OBSTETRICS & GYNECOLOGY
Payer: MEDICARE

## 2024-10-16 VITALS — WEIGHT: 170 LBS | BODY MASS INDEX: 34.27 KG/M2 | HEIGHT: 59 IN

## 2024-10-16 DIAGNOSIS — N64.4 MASTODYNIA: ICD-10-CM

## 2024-10-16 PROCEDURE — 77062 BREAST TOMOSYNTHESIS BI: CPT | Mod: TC

## 2024-10-22 ENCOUNTER — HOSPITAL ENCOUNTER (OUTPATIENT)
Dept: RADIOLOGY | Facility: HOSPITAL | Age: 64
Discharge: HOME | End: 2024-10-22
Attending: HOSPITALIST
Payer: MEDICARE

## 2024-10-22 DIAGNOSIS — G43.909 MIGRAINE HEADACHE: ICD-10-CM

## 2024-10-22 PROCEDURE — 70551 MRI BRAIN STEM W/O DYE: CPT | Mod: TC

## 2024-11-15 NOTE — ASSESSMENT & PLAN NOTE
Confirmed with fasting glucose > 126. She has been tolerating GLP-1 RA. Will increase for added weight loss benefit    Plan  - Increase Ozempic to 1 mg weekly    Labs per PCP

## 2024-11-15 NOTE — PROGRESS NOTES
"Subjective:      Patient ID: Josseline Mccullough is a 64 y.o. female.    Chief Complaint:  Type 2 diabetes mellitus    History of Present Illness  This is a 64 y.o. female. with a past medical history of Type 2 diabetes mellitus, hydrocephalus s/p  shunt here for evaluation.    Type 2 diabetes mellitus  Diagnosed in 2011 07/30/11 04:40 07/31/11 04:22   Fasting  Glucose 140 (H) 187 (H)     Component      Latest Ref Rng 11/20/2023 3/26/2024   Hemoglobin A1C, POC      % 6.3  6.1        Current diabetes medications:  - Ozempic 0.5 mg once weekly- she has been on this dose for about 4 months    Past diabetes medications:  - None    Lab Results   Component Value Date    CREATININE 0.8 11/20/2023    EGFRNORACEVR >60.0 11/20/2023       Known diabetic complications: none    Weight trend:  Wt Readings from Last 8 Encounters:   11/18/24 82.1 kg (181 lb)   10/16/24 77.1 kg (170 lb)   07/30/24 84.4 kg (186 lb)   07/16/24 85.7 kg (189 lb)   03/26/24 83.5 kg (184 lb)   12/13/23 83.9 kg (185 lb)   11/20/23 83.9 kg (185 lb)   07/17/23 86 kg (189 lb 8 oz)       Family history of diabetes:  Mother, sister    Prior visit with diabetes education: Yes    Current diet: 3 meals per day  Current exercise: About 15 minutes 3 times per week on treadmill     Blood glucose monitoring at home: No    Diabetes Management Status  Statin: Taking  ACE/ARB: Taking    Screening or Prevention Patient's value   HgA1C Testing and Control   No results found for: "HGBA1C"     LDL control No results found for: "LDLCALC"   Nephropathy screening Lab Results   Component Value Date    MICALBCREAT Unable to calculate 11/20/2023        Lab Results   Component Value Date    TSH 1.140 11/20/2023       Last eye exam: Most Recent Eye Exam Date: Not Found      Review of Systems  As above    Social and family history reviewed  Current medications and allergies reviewed    Objective:   BP (!) 142/67 (BP Location: Left arm, Patient Position: Sitting)   Pulse 73   Resp 18  " " Ht 4' 11" (1.499 m)   Wt 82.1 kg (181 lb)   SpO2 100%   BMI 36.56 kg/m²   Physical Exam  Alert, oriented    BP Readings from Last 1 Encounters:   11/18/24 (!) 142/67      Wt Readings from Last 1 Encounters:   11/18/24 1006 82.1 kg (181 lb)     Body mass index is 36.56 kg/m².    Lab Review:   No results found for: "HGBA1C"  No results found for: "CHOL", "HDL", "LDLCALC", "TRIG", "CHOLHDL"  Lab Results   Component Value Date     11/20/2023    K 3.4 (L) 11/20/2023     11/20/2023    CO2 28 11/20/2023    GLU 97 11/20/2023    BUN 13 11/20/2023    CREATININE 0.8 11/20/2023    CALCIUM 8.7 11/20/2023    PROT 7.6 11/20/2023    ALBUMIN 3.4 (L) 11/20/2023    BILITOT 0.5 11/20/2023    ALKPHOS 61 11/20/2023    AST 14 11/20/2023    ALT 16 11/20/2023    ANIONGAP 8 11/20/2023    ESTGFRAFRICA >60.0 06/21/2022    EGFRNONAA >60.0 06/21/2022    TSH 1.140 11/20/2023       All pertinent labs reviewed    Assessment and Plan     Type 2 diabetes mellitus with hyperglycemia, without long-term current use of insulin  Confirmed with fasting glucose > 126. She has been tolerating GLP-1 RA. Will increase for added weight loss benefit    Plan  - Increase Ozempic to 1 mg weekly    Labs per PCP    Severe obesity (BMI 35.0-39.9) with comorbidity  Continue GLP-1 RA for weight loss benefit    Benign essential hypertension  Continue antihypertensive regimen including ARB/ACEi    Follow-up in 6 months    Mari Falk PA-C  Endocrinology  "

## 2024-11-18 ENCOUNTER — OFFICE VISIT (OUTPATIENT)
Dept: ENDOCRINOLOGY | Facility: CLINIC | Age: 64
End: 2024-11-18
Payer: MEDICARE

## 2024-11-18 VITALS
BODY MASS INDEX: 36.49 KG/M2 | SYSTOLIC BLOOD PRESSURE: 142 MMHG | HEIGHT: 59 IN | RESPIRATION RATE: 18 BRPM | WEIGHT: 181 LBS | HEART RATE: 73 BPM | DIASTOLIC BLOOD PRESSURE: 67 MMHG | OXYGEN SATURATION: 100 %

## 2024-11-18 DIAGNOSIS — E11.65 TYPE 2 DIABETES MELLITUS WITH HYPERGLYCEMIA, WITHOUT LONG-TERM CURRENT USE OF INSULIN: Primary | ICD-10-CM

## 2024-11-18 DIAGNOSIS — I10 BENIGN ESSENTIAL HYPERTENSION: ICD-10-CM

## 2024-11-18 DIAGNOSIS — E66.01 SEVERE OBESITY (BMI 35.0-39.9) WITH COMORBIDITY: ICD-10-CM

## 2024-11-18 PROCEDURE — 3078F DIAST BP <80 MM HG: CPT | Mod: CPTII,S$GLB,, | Performed by: PHYSICIAN ASSISTANT

## 2024-11-18 PROCEDURE — 4010F ACE/ARB THERAPY RXD/TAKEN: CPT | Mod: CPTII,S$GLB,, | Performed by: PHYSICIAN ASSISTANT

## 2024-11-18 PROCEDURE — 99999 PR PBB SHADOW E&M-EST. PATIENT-LVL IV: CPT | Mod: PBBFAC,,, | Performed by: PHYSICIAN ASSISTANT

## 2024-11-18 PROCEDURE — 3008F BODY MASS INDEX DOCD: CPT | Mod: CPTII,S$GLB,, | Performed by: PHYSICIAN ASSISTANT

## 2024-11-18 PROCEDURE — 3044F HG A1C LEVEL LT 7.0%: CPT | Mod: CPTII,S$GLB,, | Performed by: PHYSICIAN ASSISTANT

## 2024-11-18 PROCEDURE — 3077F SYST BP >= 140 MM HG: CPT | Mod: CPTII,S$GLB,, | Performed by: PHYSICIAN ASSISTANT

## 2024-11-18 PROCEDURE — 1159F MED LIST DOCD IN RCRD: CPT | Mod: CPTII,S$GLB,, | Performed by: PHYSICIAN ASSISTANT

## 2024-11-18 PROCEDURE — 99214 OFFICE O/P EST MOD 30 MIN: CPT | Mod: S$GLB,,, | Performed by: PHYSICIAN ASSISTANT

## 2024-11-18 RX ORDER — SEMAGLUTIDE 1.34 MG/ML
1 INJECTION, SOLUTION SUBCUTANEOUS
Qty: 3 ML | Refills: 11 | Status: SHIPPED | OUTPATIENT
Start: 2024-11-18 | End: 2025-11-18

## 2024-12-02 PROBLEM — E78.5 HYPERLIPIDEMIA: Status: ACTIVE | Noted: 2024-12-02

## 2025-02-19 ENCOUNTER — TELEPHONE (OUTPATIENT)
Dept: ENDOCRINOLOGY | Facility: CLINIC | Age: 65
End: 2025-02-19
Payer: MEDICARE

## 2025-02-19 DIAGNOSIS — E11.65 TYPE 2 DIABETES MELLITUS WITH HYPERGLYCEMIA, WITHOUT LONG-TERM CURRENT USE OF INSULIN: Primary | ICD-10-CM

## 2025-02-19 RX ORDER — SEMAGLUTIDE 2.68 MG/ML
2 INJECTION, SOLUTION SUBCUTANEOUS
Qty: 3 ML | Refills: 11 | Status: SHIPPED | OUTPATIENT
Start: 2025-02-19 | End: 2026-02-19

## 2025-02-19 NOTE — TELEPHONE ENCOUNTER
----- Message from Mari Falk PA-C sent at 2/19/2025 11:06 AM CST -----  Contact: PATIENT  2 mg called in.  ----- Message -----  From: Katrin Delcid MA  Sent: 2/19/2025  10:54 AM CST  To: Mandeep Reinoso MD    Patient requesting increase on Ozempic,  ----- Message -----  From: Modesta Branch  Sent: 2/19/2025  10:42 AM CST  To: Kem[facility] Staff    Josseline Y IrvinMRN: [id number]: 1960PCP: Adelaida Urbina.Home Phone      [phone] Phone      Not on file.Mobile          [phone]          Not on file.MESSAGE: Patient would like to know if Dr. Reinoso can increase the dosage of her Ozempic.Phone: [phone]

## 2025-04-03 ENCOUNTER — TELEPHONE (OUTPATIENT)
Dept: ENDOCRINOLOGY | Facility: CLINIC | Age: 65
End: 2025-04-03
Payer: MEDICARE

## 2025-04-03 NOTE — TELEPHONE ENCOUNTER
Spoke with patient pharmacy filled 1 mg, advise patient to call pharmacy to see why the 2mg wasn't filled

## 2025-04-03 NOTE — TELEPHONE ENCOUNTER
----- Message from Mari Falk PA-C sent at 4/3/2025  3:48 PM CDT -----  Contact: Patient  She is on the highest dose of Ozempic. Is she incorporating diet and exercise?  ----- Message -----  From: Katrin Delcid MA  Sent: 4/3/2025   3:33 PM CDT  To: Mari Falk PA-C    Looks like she's on the highest dose, not sure what else to do besides switch  ----- Message -----  From: Patience Carrera  Sent: 4/3/2025   1:48 PM CDT  To: Chucky DANIELLE IrvinMRN: [id number]: 1960PCP: Adelaida Urbina.Home Phone      [phone] Phone      Not on file.Mobile          [phone]          Not on file.MESSAGE: Patient would like to know if the strength of her ozempic can be raised due to her dose she is currently on she does not find it is working at all.PHONE: [phone]

## 2025-04-07 ENCOUNTER — LAB VISIT (OUTPATIENT)
Dept: LAB | Facility: HOSPITAL | Age: 65
End: 2025-04-07
Attending: INTERNAL MEDICINE
Payer: MEDICARE

## 2025-04-07 DIAGNOSIS — I10 BENIGN ESSENTIAL HYPERTENSION: ICD-10-CM

## 2025-04-07 DIAGNOSIS — Z00.00 WELLNESS EXAMINATION: ICD-10-CM

## 2025-04-07 DIAGNOSIS — E55.9 VITAMIN D DEFICIENCY: ICD-10-CM

## 2025-04-07 DIAGNOSIS — Z11.4 ENCOUNTER FOR SCREENING FOR HIV: ICD-10-CM

## 2025-04-07 DIAGNOSIS — E11.9 DIABETES MELLITUS WITHOUT COMPLICATION: ICD-10-CM

## 2025-04-07 DIAGNOSIS — Z11.59 NEED FOR HEPATITIS C SCREENING TEST: ICD-10-CM

## 2025-04-07 LAB
25(OH)D3+25(OH)D2 SERPL-MCNC: 68 NG/ML (ref 30–96)
ABSOLUTE EOSINOPHIL (OHS): 0.08 K/UL
ABSOLUTE MONOCYTE (OHS): 0.44 K/UL (ref 0.3–1)
ABSOLUTE NEUTROPHIL COUNT (OHS): 2.23 K/UL (ref 1.8–7.7)
ALBUMIN SERPL BCP-MCNC: 3.7 G/DL (ref 3.5–5.2)
ALBUMIN/CREAT UR: 8.9 UG/MG
ALP SERPL-CCNC: 56 UNIT/L (ref 40–150)
ALT SERPL W/O P-5'-P-CCNC: 9 UNIT/L (ref 10–44)
ANION GAP (OHS): 8 MMOL/L (ref 8–16)
AST SERPL-CCNC: 19 UNIT/L (ref 11–45)
BASOPHILS # BLD AUTO: 0.02 K/UL
BASOPHILS NFR BLD AUTO: 0.4 %
BILIRUB SERPL-MCNC: 0.5 MG/DL (ref 0.1–1)
BUN SERPL-MCNC: 14 MG/DL (ref 8–23)
CALCIUM SERPL-MCNC: 9.3 MG/DL (ref 8.7–10.5)
CHLORIDE SERPL-SCNC: 102 MMOL/L (ref 95–110)
CO2 SERPL-SCNC: 29 MMOL/L (ref 23–29)
CREAT SERPL-MCNC: 0.8 MG/DL (ref 0.5–1.4)
CREAT UR-MCNC: 56.3 MG/DL (ref 15–325)
ERYTHROCYTE [DISTWIDTH] IN BLOOD BY AUTOMATED COUNT: 12.4 % (ref 11.5–14.5)
GFR SERPLBLD CREATININE-BSD FMLA CKD-EPI: >60 ML/MIN/1.73/M2
GLUCOSE SERPL-MCNC: 76 MG/DL (ref 70–110)
HCT VFR BLD AUTO: 33 % (ref 37–48.5)
HCV AB SERPL QL IA: NORMAL
HGB BLD-MCNC: 10.5 GM/DL (ref 12–16)
HIV 1+2 AB+HIV1 P24 AG SERPL QL IA: NORMAL
IMM GRANULOCYTES # BLD AUTO: 0.01 K/UL (ref 0–0.04)
IMM GRANULOCYTES NFR BLD AUTO: 0.2 % (ref 0–0.5)
LYMPHOCYTES # BLD AUTO: 2.21 K/UL (ref 1–4.8)
MCH RBC QN AUTO: 29.6 PG (ref 27–31)
MCHC RBC AUTO-ENTMCNC: 31.8 G/DL (ref 32–36)
MCV RBC AUTO: 93 FL (ref 82–98)
MICROALBUMIN UR-MCNC: 5 UG/ML (ref ?–5000)
NUCLEATED RBC (/100WBC) (OHS): 0 /100 WBC
PLATELET # BLD AUTO: 361 K/UL (ref 150–450)
PMV BLD AUTO: 10.1 FL (ref 9.2–12.9)
POTASSIUM SERPL-SCNC: 3.5 MMOL/L (ref 3.5–5.1)
PROT SERPL-MCNC: 7.1 GM/DL (ref 6–8.4)
RBC # BLD AUTO: 3.55 M/UL (ref 4–5.4)
RELATIVE EOSINOPHIL (OHS): 1.6 %
RELATIVE LYMPHOCYTE (OHS): 44.3 % (ref 18–48)
RELATIVE MONOCYTE (OHS): 8.8 % (ref 4–15)
RELATIVE NEUTROPHIL (OHS): 44.7 % (ref 38–73)
SODIUM SERPL-SCNC: 139 MMOL/L (ref 136–145)
TSH SERPL-ACNC: 1.18 UIU/ML (ref 0.4–4)
WBC # BLD AUTO: 4.99 K/UL (ref 3.9–12.7)

## 2025-04-07 PROCEDURE — 36415 COLL VENOUS BLD VENIPUNCTURE: CPT

## 2025-04-07 PROCEDURE — 84443 ASSAY THYROID STIM HORMONE: CPT

## 2025-04-07 PROCEDURE — 82565 ASSAY OF CREATININE: CPT

## 2025-04-07 PROCEDURE — 87389 HIV-1 AG W/HIV-1&-2 AB AG IA: CPT

## 2025-04-07 PROCEDURE — 82570 ASSAY OF URINE CREATININE: CPT

## 2025-04-07 PROCEDURE — 82306 VITAMIN D 25 HYDROXY: CPT

## 2025-04-07 PROCEDURE — 85025 COMPLETE CBC W/AUTO DIFF WBC: CPT

## 2025-04-07 PROCEDURE — 86803 HEPATITIS C AB TEST: CPT

## 2025-04-12 ENCOUNTER — RESULTS FOLLOW-UP (OUTPATIENT)
Dept: HEPATOLOGY | Facility: HOSPITAL | Age: 65
End: 2025-04-12

## 2025-04-13 NOTE — PROGRESS NOTES
Vit d good   Hiv and hep c are neagative  Rest of labs are good except for slight anemai = mvi qd and repeact cbc in 6 weeks

## (undated) DEVICE — CANNULA SUPERSOFT CO2 M AD 7FT

## (undated) DEVICE — GOWN SURGICAL BRTHBL XL

## (undated) DEVICE — LINER SUCTION CANNISTER REGUGA

## (undated) DEVICE — TUBE SUC UNIVERSAL .25XIN 6FT

## (undated) DEVICE — SYR SLIP TIP 60 CC DISP

## (undated) DEVICE — UNDERPAD DISPOSABLE 30X30IN

## (undated) DEVICE — JELLY LUBRICATING STERILE 2OZ

## (undated) DEVICE — TRAP ETRAP POLYP 50 TRAY

## (undated) DEVICE — TUBING SUC UNIV W/CONN 12FT

## (undated) DEVICE — KIT VIA CUSTOM PROCEDURE

## (undated) DEVICE — KIT BIOGUARD AIR WTR SUC VALVE

## (undated) DEVICE — BASIN EMESIS GRAPHITE 500ML

## (undated) DEVICE — SPONGE DRY VIA GREEN

## (undated) DEVICE — ELECTRODE FOAM 535 TEARDROP

## (undated) DEVICE — TUBING OXYGEN CONNECT BUBBLE

## (undated) DEVICE — ELECTRODE REM PLYHSV RETURN 9

## (undated) DEVICE — CONNECTOR TORRENT SCP OLYMPUS

## (undated) DEVICE — SOL IRRI STRL WATER 1000ML